# Patient Record
Sex: FEMALE | Race: WHITE | NOT HISPANIC OR LATINO | URBAN - METROPOLITAN AREA
[De-identification: names, ages, dates, MRNs, and addresses within clinical notes are randomized per-mention and may not be internally consistent; named-entity substitution may affect disease eponyms.]

---

## 2018-12-05 ENCOUNTER — OFFICE (OUTPATIENT)
Dept: URBAN - METROPOLITAN AREA CLINIC 33 | Facility: CLINIC | Age: 23
End: 2018-12-05

## 2018-12-05 VITALS
TEMPERATURE: 97.7 F | HEART RATE: 70 BPM | HEIGHT: 67 IN | WEIGHT: 127 LBS | DIASTOLIC BLOOD PRESSURE: 67 MMHG | SYSTOLIC BLOOD PRESSURE: 117 MMHG

## 2018-12-05 DIAGNOSIS — B18.2 CHRONIC VIRAL HEPATITIS C: ICD-10-CM

## 2018-12-05 PROCEDURE — 99243 OFF/OP CNSLTJ NEW/EST LOW 30: CPT

## 2018-12-05 NOTE — SERVICEHPINOTES
SARTHAK MORSE   is a   23   year old    female who is being seen in consultation at the request of   XUAN FARRIS   for hepatitis C. Patient just found out she is pregnant approximately six weeks and has not yet seen ob/gyn. She was dx with hepatitis C 13 months ago. 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 No known family history of liver disease. She was taking herbal supplementation but quit months ago. No ETOH use. She feels well except for fatigue and nausea since becoming pregnant. Labs reveal normal LFTs and viral level is 610. No other GI related complaints today.

## 2020-03-24 ENCOUNTER — OFFICE (OUTPATIENT)
Dept: URBAN - METROPOLITAN AREA CLINIC 34 | Facility: CLINIC | Age: 25
End: 2020-03-24

## 2020-03-24 VITALS
SYSTOLIC BLOOD PRESSURE: 109 MMHG | TEMPERATURE: 97.7 F | HEART RATE: 83 BPM | WEIGHT: 126 LBS | DIASTOLIC BLOOD PRESSURE: 66 MMHG | HEIGHT: 67 IN

## 2020-03-24 DIAGNOSIS — B18.2 CHRONIC VIRAL HEPATITIS C: ICD-10-CM

## 2020-03-24 PROCEDURE — 99214 OFFICE O/P EST MOD 30 MIN: CPT | Performed by: PHYSICIAN ASSISTANT

## 2020-03-24 NOTE — SERVICEHPINOTES
25 yo female presents to discuss treatment for her hepatitis C. She had presented in December 2018 but was pregnant at the time so further evaluation and treatment was deferred. Her baby is 8 months old and she is breast-feeding but plans to stop prior to treatment. 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 Doing well and committed to staying clean. Denies alcohol use. She has some fatigue. No GI concerns.

## 2020-05-06 ENCOUNTER — TELEHEALTH PROVIDED OTHER THAN IN PATIENT'S HOME (OUTPATIENT)
Dept: URBAN - METROPOLITAN AREA TELEHEALTH 3 | Facility: TELEHEALTH | Age: 25
End: 2020-05-06
Payer: COMMERCIAL

## 2020-05-06 VITALS — HEIGHT: 67 IN

## 2020-05-06 DIAGNOSIS — R19.7 DIARRHEA, UNSPECIFIED: ICD-10-CM

## 2020-05-06 DIAGNOSIS — R10.11 RIGHT UPPER QUADRANT PAIN: ICD-10-CM

## 2020-05-06 DIAGNOSIS — B18.2 CHRONIC VIRAL HEPATITIS C: ICD-10-CM

## 2020-05-06 PROCEDURE — 99214 OFFICE O/P EST MOD 30 MIN: CPT | Mod: 95 | Performed by: PHYSICIAN ASSISTANT

## 2020-05-06 NOTE — SERVICEHPINOTES
PATIENT VERIFIED BY DATE OF BIRTH AND NAME. Patient has been consented for this telecommunication visit. 26 yo female presents for f/u hepatitis C. She has genotype 3 and is treatment-naive. Her HCV Fibrosure lab test predicted no fibrosis. She did get her Mavyret approved and shipped to her already, but hasn't started yet as she is still breast-feeding. She is trying to wean her baby (about 9 months old) off and switch to bottle feeding, but it has been more challenging than she expected. She therefore will hold off on treatment until the weaning process is complete.She is doing well overall but does note that she has been having diarrhea over the past month. She is having 4-5 loose BMs per day. She notes more of a yellow color to the stools. She has been having some RUQ discomfort at night and can get some abdominal cramping during the day (more generalized throughout belly) - prior to BMs. She denies blood in stools. No abdominal pain with eating. She denies N/V, fevers or weight loss. She feels that stress may be playing a role in her symptoms. She denies worsening of diarrhea. No h/o antibiotic use for a long time.  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 Doing well and committed to staying clean. Denies alcohol use. She has some fatigue. No other concerns today.ROS is otherwise positive for heart palpitations, dry skin, and anxiety.  3/26/20 plt 173, HCV Fibrosure - F0, AST/ALT 72/140, HCV RNA 8,510 IU/ml, INR 1.0, HBsAb neg, HIV neg, HBsAg neg, HBcAb tot neg, Hep A ab tot neg, Hep C GT 3

## 2020-09-29 ENCOUNTER — TELEHEALTH PROVIDED OTHER THAN IN PATIENT'S HOME (OUTPATIENT)
Dept: URBAN - METROPOLITAN AREA TELEHEALTH 7 | Facility: TELEHEALTH | Age: 25
End: 2020-09-29

## 2020-09-29 VITALS — WEIGHT: 131 LBS | HEIGHT: 67 IN

## 2020-09-29 DIAGNOSIS — B18.2 CHRONIC VIRAL HEPATITIS C: ICD-10-CM

## 2020-09-29 PROCEDURE — 99214 OFFICE O/P EST MOD 30 MIN: CPT | Mod: 95 | Performed by: PHYSICIAN ASSISTANT

## 2020-09-29 NOTE — SERVICEHPINOTES
PATIENT VERIFIED BY DATE OF BIRTH AND NAME. Patient has been consented for this telecommunication visit. 26 yo female presents for f/u hepatitis C. She has h/o genotype 3 with HCV Fibrosure showing F0. She just finished 8 weeks of Mavyret. States she did well on the treatment and took her medication consistently. She had waited until her baby was weaned to start the treatment. Recent labs show normal liver enzymes and no detected HCV RNA. 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 Doing well and committed to staying clean. Denies alcohol use. ROS is positive for fatigue but otherwise negative. 9/25/20 plt 165, AST/ALT 15/15, HCV RNA not detectedBR8/28/20 plt 156, AST/aLT 17/15, HCV RNA &[id number]/26/20 plt 173, HCV Fibrosure - F0, AST/ALT 72/140, HCV RNA 8,510 IU/ml, INR 1.0, HBsAb neg, HIV neg, HBsAg neg, HBcAb tot neg, Hep A ab tot neg, Hep C GT 3